# Patient Record
Sex: MALE | Race: WHITE | URBAN - METROPOLITAN AREA
[De-identification: names, ages, dates, MRNs, and addresses within clinical notes are randomized per-mention and may not be internally consistent; named-entity substitution may affect disease eponyms.]

---

## 2017-03-18 ENCOUNTER — OFFICE VISIT (OUTPATIENT)
Dept: URGENT CARE | Facility: URGENT CARE | Age: 22
End: 2017-03-18
Payer: COMMERCIAL

## 2017-03-18 VITALS
DIASTOLIC BLOOD PRESSURE: 86 MMHG | SYSTOLIC BLOOD PRESSURE: 139 MMHG | HEART RATE: 80 BPM | OXYGEN SATURATION: 97 % | TEMPERATURE: 97.8 F

## 2017-03-18 DIAGNOSIS — R42 POSTURAL DIZZINESS WITH PRESYNCOPE: ICD-10-CM

## 2017-03-18 DIAGNOSIS — R23.2 HOT FLASHES: ICD-10-CM

## 2017-03-18 DIAGNOSIS — R55 POSTURAL DIZZINESS WITH PRESYNCOPE: ICD-10-CM

## 2017-03-18 DIAGNOSIS — L50.9 URTICARIA: Primary | ICD-10-CM

## 2017-03-18 LAB
BASOPHILS # BLD AUTO: 0 10E9/L (ref 0–0.2)
BASOPHILS NFR BLD AUTO: 0.3 %
DIFFERENTIAL METHOD BLD: NORMAL
EOSINOPHIL # BLD AUTO: 0.2 10E9/L (ref 0–0.7)
EOSINOPHIL NFR BLD AUTO: 2.5 %
ERYTHROCYTE [DISTWIDTH] IN BLOOD BY AUTOMATED COUNT: 12.3 % (ref 10–15)
ERYTHROCYTE [SEDIMENTATION RATE] IN BLOOD BY WESTERGREN METHOD: 3 MM/H (ref 0–15)
HCT VFR BLD AUTO: 50.2 % (ref 40–53)
HGB BLD-MCNC: 17 G/DL (ref 13.3–17.7)
LYMPHOCYTES # BLD AUTO: 2.2 10E9/L (ref 0.8–5.3)
LYMPHOCYTES NFR BLD AUTO: 33.3 %
MCH RBC QN AUTO: 30.7 PG (ref 26.5–33)
MCHC RBC AUTO-ENTMCNC: 33.9 G/DL (ref 31.5–36.5)
MCV RBC AUTO: 91 FL (ref 78–100)
MONOCYTES # BLD AUTO: 0.5 10E9/L (ref 0–1.3)
MONOCYTES NFR BLD AUTO: 7 %
NEUTROPHILS # BLD AUTO: 3.7 10E9/L (ref 1.6–8.3)
NEUTROPHILS NFR BLD AUTO: 56.9 %
PLATELET # BLD AUTO: 250 10E9/L (ref 150–450)
RBC # BLD AUTO: 5.53 10E12/L (ref 4.4–5.9)
WBC # BLD AUTO: 6.5 10E9/L (ref 4–11)

## 2017-03-18 PROCEDURE — 99202 OFFICE O/P NEW SF 15 MIN: CPT | Performed by: FAMILY MEDICINE

## 2017-03-18 PROCEDURE — 85652 RBC SED RATE AUTOMATED: CPT | Performed by: FAMILY MEDICINE

## 2017-03-18 PROCEDURE — 36415 COLL VENOUS BLD VENIPUNCTURE: CPT | Performed by: FAMILY MEDICINE

## 2017-03-18 PROCEDURE — 80050 GENERAL HEALTH PANEL: CPT | Performed by: FAMILY MEDICINE

## 2017-03-18 RX ORDER — CETIRIZINE HYDROCHLORIDE 10 MG/1
10-20 TABLET ORAL EVERY EVENING
Qty: 60 TABLET | Refills: 1 | Status: SHIPPED | OUTPATIENT
Start: 2017-03-18

## 2017-03-18 NOTE — NURSING NOTE
"Chief Complaint   Patient presents with     Urgent Care     Pt in clinic to have eval for hot flashes and feeling of pins and needles on head, shoulders, and arms.     Derm Problem       Initial /86 (BP Location: Left arm, Patient Position: Chair, Cuff Size: Adult Large)  Pulse 80  Temp 97.8  F (36.6  C) (Tympanic)  SpO2 97% Estimated body mass index is 19.8 kg/(m^2) as calculated from the following:    Height as of 8/8/12: 5' 11\" (1.803 m).    Weight as of 8/8/12: 142 lb (64.4 kg).  Medication Reconciliation: complete   Doreen Mcclellan/ MA    "

## 2017-03-18 NOTE — MR AVS SNAPSHOT
After Visit Summary   3/18/2017    Scott Saravia    MRN: [id number]           Patient Information     Date Of Birth          1995        Visit Information        Provider Department      3/18/2017 4:30 PM Ruthann Bragg MD [facility]        Today's Diagnoses     Urticaria    -  1    Hot flashes          Care Instructions      Understanding Urticaria (Hives)  Urticaria (hives) are red, itchy, and swollen areas on the skin. They are most often an allergic reaction from eating a food or taking a medicine. Sometimes the cause may be unknown. A single hive can vary in size from a half inch to several inches. Hives can appear all over the body. Or they may appear on only one part of the body.  Causes of Hives  Hives can be caused by food and beverages such as:    Nuts    Peanuts    Eggs    Shellfish    Milk  Hives can also be caused by medicines such as:    Antibiotics, especially penicillin and sulfa-based medicines     Anticonvulsant drugs or antiseizure medicines     Chemotherapy medicines   Other causes of hives include:    Dermatographism. These are hives caused by scratching or rubbing of the skin, or wearing tight-fitting clothes that rub the skin.    Cold-induced. These are hives caused by exposure to cold air or water.    Solar hives. These are hives caused by exposure to sunlight or light bulb light.    Exercise-induced urticaria. These are allergic symptoms brought on by physical activity.    Chronic urticaria. These are hives that occur again and again with no known cause.  If You Have Hives    Avoid the food, drink, medicine, or other factor that may be causing the hives.    Make a thick paste of baking soda and water. Apply the paste directly to your skin. This can help lessen itching.    Talk with your health care provider right away if you think a medication gave you hives.  Watch for Anaphalaxis  If you have hives, watch for symptoms of a severe  "reaction that affects your entire body, called anaphylaxis. Symptoms can include swollen areas of the body, wheezing, trouble breathing or swallowing, and a hoarse voice. This reaction may happen right away. Or it may happen in an hour or more. In extreme cases, the airways from mouth to lungs may swell and prevent breathing. This is a medical emergency. Use epinephrine medication if you have it, and call 911 or go to the emergency room.     When to Call the Health Care Provider  Call 911 right away if you have:    Swelling in the lips, tongue, or throat (angioedema)     Trouble breathing or swallowing        5953-4059 [employer]. [address] [place] [address]. All rights reserved. This information is not intended as a substitute for professional medical care. Always follow your healthcare professional's instructions.              Follow-ups after your visit        Who to contact     If you have questions or need follow up information about today's clinic visit or your schedule please contact [facility] directly at [phone].  Normal or non-critical lab and imaging results will be communicated to you by DFinehart, letter or phone within 4 business days after the clinic has received the results. If you do not hear from us within 7 days, please contact the clinic through AdYouNett or phone. If you have a critical or abnormal lab result, we will notify you by phone as soon as possible.  Submit refill requests through [employer] or call your pharmacy and they will forward the refill request to us. Please allow 3 business days for your refill to be completed.          Additional Information About Your Visit        [employer] Information     [employer] lets you send messages to your doctor, view your test results, renew your prescriptions, schedule appointments and more. To sign up, go to [email or web address] . Click on \"Log in\" on the left side of the screen, which will take " "you to the Welcome page. Then click on \"Sign up Now\" on the right side of the page.     You will be asked to enter the access code listed below, as well as some personal information. Please follow the directions to create your username and password.     Your access code is: [email or web address]  Expires: 2017  5:31 PM     Your access code will  in 90 days. If you need help or a new code, please call your [facility] clinic or [phone].        Care EveryWhere ID     This is your Care EveryWhere ID. This could be used by other organizations to access your [facility] medical records  [id number]        Your Vitals Were     Pulse Temperature Pulse Oximetry             80 97.8  F (36.6  C) (Tympanic) 97%          Blood Pressure from Last 3 Encounters:   17 139/86   12 121/68   03/23/10 116/73    Weight from Last 3 Encounters:   12 142 lb (64.4 kg) (73 %)*   09/13/10 105 lb (47.6 kg) (52 %)*   03/23/10 96 lb (43.5 kg) (45 %)*     * Growth percentiles are based on [facility] 2-20 Years data.              We Performed the Following     CBC with platelets and differential     Comprehensive metabolic panel (BMP + Alb, Alk Phos, ALT, AST, Total. Bili, TP)     ESR: Erythrocyte sedimentation rate     TSH with free T4 reflex          Today's Medication Changes          These changes are accurate as of: 3/18/17  5:31 PM.  If you have any questions, ask your nurse or doctor.               Start taking these medicines.        Dose/Directions    cetirizine 10 MG tablet   Commonly known as:  zyrTEC   Used for:  Hot flashes, Urticaria   Started by:  Ruthann Bragg MD        Dose:  10-20 mg   Take 1-2 tablets (10-20 mg) by mouth every evening   Quantity:  60 tablet   Refills:  1            Where to get your medicines      These medications were sent to [facility] - [place], [place] -  [address] AT [facility] [address]   [address] [place] [address]     Phone:  [phone]     " cetirizine 10 MG tablet                Primary Care Provider Office Phone # Fax #    Dg Cristhian Gibbs PA-C [phone]       [facility] [address] [facility] [address]        Thank you!     Thank you for choosing [facility]  for your care. Our goal is always to provide you with excellent care. Hearing back from our patients is one way we can continue to improve our services. Please take a few minutes to complete the written survey that you may receive in the mail after your visit with us. Thank you!             Your Updated Medication List - Protect others around you: Learn how to safely use, store and throw away your medicines at www.disposemymeds.org.          This list is accurate as of: 3/18/17  5:31 PM.  Always use your most recent med list.                   Brand Name Dispense Instructions for use    cetirizine 10 MG tablet    zyrTEC    60 tablet    Take 1-2 tablets (10-20 mg) by mouth every evening       NO ACTIVE MEDICATIONS      .       UNABLE TO FIND      MEDICATION NAME: Jose Roberto

## 2017-03-18 NOTE — LETTER
[facility]   [address], [place]  [address]  [phone]        March 26, 2017      Scott Saravia  [address]  [place] [address]          Dear Mr. Saravia,    The results of your recent lab tests were within normal limits. Enclosed is a copy of these results.  If you have any further questions or problems, please contact our office.    Sincerely,      [facility]    Resulted Orders   CBC with platelets and differential   Result Value Ref Range    WBC 6.5 4.0 - 11.0 10e9/L    RBC Count 5.53 4.4 - 5.9 10e12/L    Hemoglobin 17.0 13.3 - 17.7 g/dL    Hematocrit 50.2 40.0 - 53.0 %    MCV 91 78 - 100 fl    MCH 30.7 26.5 - 33.0 pg    MCHC 33.9 31.5 - 36.5 g/dL    RDW 12.3 10.0 - 15.0 %    Platelet Count 250 150 - 450 10e9/L    Diff Method Automated Method     % Neutrophils 56.9 %    % Lymphocytes 33.3 %    % Monocytes 7.0 %    % Eosinophils 2.5 %    % Basophils 0.3 %    Absolute Neutrophil 3.7 1.6 - 8.3 10e9/L    Absolute Lymphocytes 2.2 0.8 - 5.3 10e9/L    Absolute Monocytes 0.5 0.0 - 1.3 10e9/L    Absolute Eosinophils 0.2 0.0 - 0.7 10e9/L    Absolute Basophils 0.0 0.0 - 0.2 10e9/L   Comprehensive metabolic panel (BMP + Alb, Alk Phos, ALT, AST, Total. Bili, TP)   Result Value Ref Range    Sodium 141 133 - 144 mmol/L    Potassium 4.4 3.4 - 5.3 mmol/L    Chloride 103 98 - 110 mmol/L    Carbon Dioxide 31 20 - 32 mmol/L    Anion Gap 7 3 - 14 mmol/L    Glucose 86 70 - 99 mg/dL    Urea Nitrogen 16 7 - 30 mg/dL    Creatinine 0.92 0.50 - 1.00 mg/dL    GFR Estimate >90  Non  GFR Calc   >60 mL/min/1.7m2    GFR Estimate If Black >90   GFR Calc   >60 mL/min/1.7m2    Calcium 9.4 8.5 - 10.1 mg/dL    Bilirubin Total 1.1 0.2 - 1.3 mg/dL    Albumin 4.6 3.4 - 5.0 g/dL    Protein Total 8.1 6.8 - 8.8 g/dL    Alkaline Phosphatase 82 65 - 260 U/L    ALT 35 0 - 50 U/L    AST 16 0 - 35 U/L   ESR: Erythrocyte sedimentation rate   Result Value  Ref Range    Sed Rate 3 0 - 15 mm/h   TSH with free T4 reflex   Result Value Ref Range    TSH 3.66 0.40 - 4.00 mU/L

## 2017-03-18 NOTE — PATIENT INSTRUCTIONS
Understanding Urticaria (Hives)  Urticaria (hives) are red, itchy, and swollen areas on the skin. They are most often an allergic reaction from eating a food or taking a medicine. Sometimes the cause may be unknown. A single hive can vary in size from a half inch to several inches. Hives can appear all over the body. Or they may appear on only one part of the body.  Causes of Hives  Hives can be caused by food and beverages such as:    Nuts    Peanuts    Eggs    Shellfish    Milk  Hives can also be caused by medicines such as:    Antibiotics, especially penicillin and sulfa-based medicines     Anticonvulsant drugs or antiseizure medicines     Chemotherapy medicines   Other causes of hives include:    Dermatographism. These are hives caused by scratching or rubbing of the skin, or wearing tight-fitting clothes that rub the skin.    Cold-induced. These are hives caused by exposure to cold air or water.    Solar hives. These are hives caused by exposure to sunlight or light bulb light.    Exercise-induced urticaria. These are allergic symptoms brought on by physical activity.    Chronic urticaria. These are hives that occur again and again with no known cause.  If You Have Hives    Avoid the food, drink, medicine, or other factor that may be causing the hives.    Make a thick paste of baking soda and water. Apply the paste directly to your skin. This can help lessen itching.    Talk with your health care provider right away if you think a medication gave you hives.  Watch for Anaphalaxis  If you have hives, watch for symptoms of a severe reaction that affects your entire body, called anaphylaxis. Symptoms can include swollen areas of the body, wheezing, trouble breathing or swallowing, and a hoarse voice. This reaction may happen right away. Or it may happen in an hour or more. In extreme cases, the airways from mouth to lungs may swell and prevent breathing. This is a medical emergency. Use epinephrine medication if  you have it, and call 911 or go to the emergency room.     When to Call the Health Care Provider  Call 911 right away if you have:    Swelling in the lips, tongue, or throat (angioedema)     Trouble breathing or swallowing        9522-4453 [employer]. [address] [place] [address]. All rights reserved. This information is not intended as a substitute for professional medical care. Always follow your healthcare professional's instructions.

## 2017-03-19 LAB
ALBUMIN SERPL-MCNC: 4.6 G/DL (ref 3.4–5)
ALP SERPL-CCNC: 82 U/L (ref 65–260)
ALT SERPL W P-5'-P-CCNC: 35 U/L (ref 0–50)
ANION GAP SERPL CALCULATED.3IONS-SCNC: 7 MMOL/L (ref 3–14)
AST SERPL W P-5'-P-CCNC: 16 U/L (ref 0–35)
BILIRUB SERPL-MCNC: 1.1 MG/DL (ref 0.2–1.3)
BUN SERPL-MCNC: 16 MG/DL (ref 7–30)
CALCIUM SERPL-MCNC: 9.4 MG/DL (ref 8.5–10.1)
CHLORIDE SERPL-SCNC: 103 MMOL/L (ref 98–110)
CO2 SERPL-SCNC: 31 MMOL/L (ref 20–32)
CREAT SERPL-MCNC: 0.92 MG/DL (ref 0.5–1)
GFR SERPL CREATININE-BSD FRML MDRD: NORMAL ML/MIN/1.7M2
GLUCOSE SERPL-MCNC: 86 MG/DL (ref 70–99)
POTASSIUM SERPL-SCNC: 4.4 MMOL/L (ref 3.4–5.3)
PROT SERPL-MCNC: 8.1 G/DL (ref 6.8–8.8)
SODIUM SERPL-SCNC: 141 MMOL/L (ref 133–144)
TSH SERPL DL<=0.005 MIU/L-ACNC: 3.66 MU/L (ref 0.4–4)

## 2017-03-19 NOTE — PROGRESS NOTES
"SUBJECTIVE:  Scott Saravia is a 21 year old male who presents to the clinic today for \"painful pins and needles\" type sensation and rash on the chest and head area when exercises or gets hot.    Associated symptoms include: Feels flushed.  The episodes have been happening since 1 year ago but have become more often in the past week.  Previously, only had the symptoms after rode his bike and got overheated.  Once he took off his gear and felt cooler, the symptoms resolved.  This week, he gets the symptoms when moving about in a T-shirt or when taking a hot shower.  Yesterday, noticed some red blotches on the chest that lasted about 10 minutes and then went away.  Has h/o seasonal spring allergies.  Not taking any medications.  No dyspnea. No mouth swelling.  Not a smoker.  No drugs.  Eats healthy.      Past Medical History   Diagnosis Date     NO ACTIVE PROBLEMS      Current Outpatient Prescriptions   Medication Sig Dispense Refill     UNABLE TO FIND MEDICATION NAME: [place]       cetirizine (ZYRTEC) 10 MG tablet Take 1-2 tablets (10-20 mg) by mouth every evening 60 tablet 1     NO ACTIVE MEDICATIONS .       Social History   Substance Use Topics     Smoking status: Never Smoker     Smokeless tobacco: Never Used     Alcohol use No       EXAM:   /86 (BP Location: Left arm, Patient Position: Chair, Cuff Size: Adult Large)  Pulse 80  Temp 97.8  F (36.6  C) (Tympanic)  SpO2 97%  GENERAL: alert, no acute distress.  SKIN: Rash description:  Gone at first and then, when got nervous, the rash appeared  Distribution: localized  Location: mid chest    Color: red,  Lesion type: macular, blotchy, isolated with dermatographism  GENERAL APPEARANCE: healthy, alert and no distress  EYES: Eyes grossly normal to inspection  HENT: ear canals and TM's normal.  Nose and mouth without ulcers, erythema or lesions  NECK: supple, non-tender to palpation, no adenopathy noted  RESP: lungs clear to auscultation - no rales, rhonchi or " wheezes  CV: regular rates and rhythm, normal S1 S2, no murmur noted    Labs:   Results for orders placed or performed in visit on 03/18/17   CBC with platelets and differential   Result Value Ref Range    WBC 6.5 4.0 - 11.0 10e9/L    RBC Count 5.53 4.4 - 5.9 10e12/L    Hemoglobin 17.0 13.3 - 17.7 g/dL    Hematocrit 50.2 40.0 - 53.0 %    MCV 91 78 - 100 fl    MCH 30.7 26.5 - 33.0 pg    MCHC 33.9 31.5 - 36.5 g/dL    RDW 12.3 10.0 - 15.0 %    Platelet Count 250 150 - 450 10e9/L    Diff Method Automated Method     % Neutrophils 56.9 %    % Lymphocytes 33.3 %    % Monocytes 7.0 %    % Eosinophils 2.5 %    % Basophils 0.3 %    Absolute Neutrophil 3.7 1.6 - 8.3 10e9/L    Absolute Lymphocytes 2.2 0.8 - 5.3 10e9/L    Absolute Monocytes 0.5 0.0 - 1.3 10e9/L    Absolute Eosinophils 0.2 0.0 - 0.7 10e9/L    Absolute Basophils 0.0 0.0 - 0.2 10e9/L   ESR: Erythrocyte sedimentation rate   Result Value Ref Range    Sed Rate 3 0 - 15 mm/h      ASSESSMENT:  Urticaria-likely cholinergic induced  Hot Flashes  Postural dizziness with presyncope (during the blood draw)    PLAN:  1)  Patient felt faint after the blood was drawn.  His blood pressure was 90's/60's at first and then 110's/70's.  He was laid on the ground and cold packs placed to posterior neck area.  He ate some cookies and had some OJ and felt back to normal.  See today's orders.   Await pending labs.    2)  Follow-up with Derm if not improving.  Ruthann Carmona MD

## 2017-03-27 ENCOUNTER — OFFICE VISIT (OUTPATIENT)
Dept: ALLERGY | Facility: CLINIC | Age: 22
End: 2017-03-27
Payer: COMMERCIAL

## 2017-03-27 VITALS
HEIGHT: 73 IN | SYSTOLIC BLOOD PRESSURE: 138 MMHG | HEART RATE: 79 BPM | DIASTOLIC BLOOD PRESSURE: 73 MMHG | OXYGEN SATURATION: 97 % | WEIGHT: 165.2 LBS | BODY MASS INDEX: 21.89 KG/M2

## 2017-03-27 DIAGNOSIS — J30.1 SEASONAL ALLERGIC RHINITIS DUE TO POLLEN: ICD-10-CM

## 2017-03-27 DIAGNOSIS — L50.5 CHOLINERGIC URTICARIA: Primary | ICD-10-CM

## 2017-03-27 PROCEDURE — 99203 OFFICE O/P NEW LOW 30 MIN: CPT | Performed by: ALLERGY & IMMUNOLOGY

## 2017-03-27 RX ORDER — DIPHENHYDRAMINE HYDROCHLORIDE, ZINC ACETATE 2; .1 G/100G; G/100G
CREAM TOPICAL 2 TIMES DAILY PRN
COMMUNITY

## 2017-03-27 NOTE — PATIENT INSTRUCTIONS
Allergy Staff Appt Hours Shot Hours Locations    Physician     Seth Callejas DO       Support Staff     Farzaneh RUFF RN      Edith PETERSON MA  Monday:                      [place] 8-7     Tuesday:         [place] 8-5 Wednesday:        [place]: 7-5     Friday:        [place] 7-5   [place]        Monday: 9-6        Friday: 7-2     [place]        Tuesday: 7-12 Thursday: 1-6 [place] Tuesday: 1-6 Wednesday: 11-6 Thursday: 7-12 [facility]  [address] [place] [address]  Appt Line: [phone]  Allergy RN (Monday):  [phone]    [facility]  [address] [place] [address]  Appt Line: [phone]  Allergy RN (Tues & Wed):  [phone]    [facility]  [address] [place] [address]  Appt Line: [phone]  Allergy RN (Friday):  [phone]       Important Scheduling Information  Aspirin Desensitization: Appt will last 2 clinic days. Please call the Allergy RN line for your clinic to schedule. Discontinue antihistamines 7 days prior to the appointment.     Food Challenges: Appt will last 3-4 hours. Please call the Allergy RN line for your clinic to schedule. Discontinue antihistamines 7 days prior to the appointment.     Penicillin Testing: Appt will last 2-3 hours. Please call the Allergy RN line for your clinic to schedule. Discontinue antihistamines 7 days prior to the appointment.     Skin Testing: Appt will about 40 minutes. Call the appointment line for your clinic to schedule. Discontinue antihistamines 7 days prior to the appointment.     Venom Testing: Appt will last 2-3 hours. Please call the Allergy RN line for your clinic to schedule. Discontinue antihistamines 7 days prior to the appointment.     Thank you for trusting us with your Allergy, Asthma, and Immunology care. Please feel free to contact us with any questions or concerns you may have.      - Increase Zyrtec to 20mg by mouth twice daily.   - Stop Benadryl.   -  If you continue to have symptoms please contact our office.

## 2017-03-27 NOTE — MR AVS SNAPSHOT
After Visit Summary   3/27/2017    Scott Saravia    MRN: [id number]           Patient Information     Date Of Birth          1995        Visit Information        Provider Department      3/27/2017 4:20 PM Seth Callejas DO [facility] Instructions    Allergy Staff Appt Hours Shot Hours Locations    Physician     Seth Callejas DO       Support Staff     AKSHAT cMdonald MA  Monday:                      [place] 8-7     Tuesday:         [place] 8-5     Wednesday:        [place]: 7-5     Friday:        [place] 7-5   [place] Monday: 9-6 Friday: 7-2     [place]        Tuesday: 7-12 Thursday: 1-6 [place] Tuesday: 1-6 Wednesday: 11-6 Thursday: 7-12 [facility]  [address] [place] [address]  Appt Line: [phone]  Allergy RN (Monday):  [phone]    [facility]  [address] [place] [address]  Appt Line: [phone]  Allergy RN (Tues & Wed):  [phone]    [facility]  [address] [place] [address]  Appt Line: [phone]  Allergy RN (Friday):  [phone]       Important Scheduling Information  Aspirin Desensitization: Appt will last 2 clinic days. Please call the Allergy RN line for your clinic to schedule. Discontinue antihistamines 7 days prior to the appointment.     Food Challenges: Appt will last 3-4 hours. Please call the Allergy RN line for your clinic to schedule. Discontinue antihistamines 7 days prior to the appointment.     Penicillin Testing: Appt will last 2-3 hours. Please call the Allergy RN line for your clinic to schedule. Discontinue antihistamines 7 days prior to the appointment.     Skin Testing: Appt will about 40 minutes. Call the appointment line for your clinic to schedule. Discontinue antihistamines 7 days prior to the appointment.     Venom Testing: Appt will last 2-3 hours. Please call the Allergy RN line for your clinic  to schedule. Discontinue antihistamines 7 days prior to the appointment.     Thank you for trusting us with your Allergy, Asthma, and Immunology care. Please feel free to contact us with any questions or concerns you may have.      - Increase Zyrtec to 20mg by mouth twice daily.   - Stop Benadryl.   - If you continue to have symptoms please contact our office.         Follow-ups after your visit        Follow-up notes from your care team     Return in about 3 months (around 6/27/2017).      Your next 10 appointments already scheduled     May 22, 2017  1:55 PM CDT   (Arrive by 1:40 PM)   New Allergy with Cristhian Callaway MD   [facility] ([facility])    [address]  [address] [facility] [address] [phone]           Do not take anti-histamines or Zantac for seven day prior to your appointment.              Who to contact     If you have questions or need follow up information about today's clinic visit or your schedule please contact [facility] directly at [phone].  Normal or non-critical lab and imaging results will be communicated to you by stylefruitshart, letter or phone within 4 business days after the clinic has received the results. If you do not hear from us within 7 days, please contact the clinic through Pl[employer] or phone. If you have a critical or abnormal lab result, we will notify you by phone as soon as possible.  Submit refill requests through [employer] or call your pharmacy and they will forward the refill request to us. Please allow 3 business days for your refill to be completed.          Additional Information About Your Visit        [employer] Information     [employer] gives you secure access to your electronic health record. If you see a primary care provider, you can also send messages to your care team and make appointments. If you have questions, please call your primary care clinic.  If you do not have a  "primary care provider, please call [phone] and they will assist you.        Care EveryWhere ID     This is your Care EveryWhere ID. This could be used by other organizations to access your [facility] medical records  [id number]        Your Vitals Were     Pulse Height Pulse Oximetry BMI (Body Mass Index)          79 1.845 m (6' 0.64\") 97% 22.01 kg/m2         Blood Pressure from Last 3 Encounters:   03/27/17 138/73   03/18/17 139/86   08/08/12 121/68    Weight from Last 3 Encounters:   03/27/17 74.9 kg (165 lb 3.2 oz) (65 %)*   08/08/12 64.4 kg (142 lb) (73 %)*   09/13/10 47.6 kg (105 lb) (52 %)*     * Growth percentiles are based on [facility] 2-20 Years data.              Today, you had the following     No orders found for display       Primary Care Provider Office Phone # Fax #    Dg Gibbs PA-C [phone]       [facility] [address] [facility] [address]        Thank you!     Thank you for choosing [facility]  for your care. Our goal is always to provide you with excellent care. Hearing back from our patients is one way we can continue to improve our services. Please take a few minutes to complete the written survey that you may receive in the mail after your visit with us. Thank you!             Your Updated Medication List - Protect others around you: Learn how to safely use, store and throw away your medicines at www.disposemymeds.org.          This list is accurate as of: 3/27/17  4:54 PM.  Always use your most recent med list.                   Brand Name Dispense Instructions for use    cetirizine 10 MG tablet    zyrTEC    60 tablet    Take 1-2 tablets (10-20 mg) by mouth every evening       diphenhydrAMINE-zinc acetate cream    BENADRYL     Apply topically 2 times daily as needed for itching       MELATONIN PO      Take 3 mg by mouth At Bedtime       NO ACTIVE MEDICATIONS      .       UNABLE TO FIND      MEDICATION NAME: Jose Roberto         "

## 2017-03-27 NOTE — PROGRESS NOTES
Scott Saravia is a 21 year old White male with previous medical history significant for presumed spring seasonal allergic rhinitis. Scott Saravia is being seen today for evaluation of hives.     Patient reports that over the last few months when he has been exercising he has developed erythematous skin that is not raised that burns/stings. This can occur on his head or his torso. Symptoms will last a few minutes and then resolve on own. He first noticed when he was biking outside with extra gear in the cold weather. Subsequently, the patient has had episodes with warm showers, warm rooms and stress. He reports that thinking back he has rarely had this over the last few years. He was started on cetirizine 10 mg by mouth twice daily and diphenhydramine twice daily. This has been somewhat beneficial, but not entirely. He denies symptoms of angioedema, vomiting, diarrhea, shortness of breath, wheezing or tightness in his chest.    Patient has a history of mild congestion, ocular itching and ocular watering in the spring. No symptoms at any other time of the year. Symptoms are made worse with mowing grass. He typically does not medications for these symptoms. No history of allergy testing.    The patient has no history of asthma, eczema, food allergies, medications allergies.     ENVIRONMENTAL HISTORY: [place] The [place] is heated with a water. They does not have central air conditioning. The patient's bedroom is furnished with carpeting in bedroom.  Pets inside the house include 0 . There is not history of cockroach or mice infestation. There is/are 0 smokers in the house.  The house does not have a damp basement.     Past Medical History:   Diagnosis Date     NO ACTIVE PROBLEMS      History reviewed. No pertinent family history.  Past Surgical History:   Procedure Laterality Date     NO HISTORY OF SURGERY         REVIEW OF SYSTEMS:  General: negative for weight gain.  negative for weight loss. negative for changes in sleep.   Ears: negative for fullness. negative for hearing loss. negative for dizziness.   Nose: negative for snoring.negative for changes in smell. negative for drainage.   Eyes: negative for eye watering. negative for eye itching. negative for vision changes. negative for eye redness.  Throat: negative for hoarseness. negative for sore throat. negative for trouble swallowing.   Lungs: negative for shortness of breath.negative for wheezing. negative for sputum production.   Cardiovascular: negative for chest pain. negative for swelling of ankles. negative for fast or irregular heartbeat.   Gastrointestinal: negative for nausea. negative for heartburn. negative for acid reflux.   Musculoskeletal: negative for joint pain. negative for joint stiffness. negative for joint swelling.   Neurologic: negative for seizures. negative for fainting. negative for weakness.   Psychiatric: negative for changes in mood. negative for anxiety.   Endocrine: negative for cold intolerance. positive  for heat intolerance. negative for tremors.   Lymphatic: negative for lower extremity swelling. negative for lymph node swelling.   Hematologic: negative for easy bruising. negative for easy bleeding.  Integumentary: positive  for rash. negative for scaling. negative for nail changes.       Current Outpatient Prescriptions:      diphenhydrAMINE-zinc acetate (BENADRYL) cream, Apply topically 2 times daily as needed for itching, Disp: , Rfl:      MELATONIN PO, Take 3 mg by mouth At Bedtime, Disp: , Rfl:      cetirizine (ZYRTEC) 10 MG tablet, Take 1-2 tablets (10-20 mg) by mouth every evening, Disp: 60 tablet, Rfl: 1     UNABLE TO FIND, MEDICATION NAME: Jose Roberto, Disp: , Rfl:      NO ACTIVE MEDICATIONS, ., Disp: , Rfl:   Immunization History   Administered Date(s) Administered     DTAP (<7y) 1997, 1997, 1997, 01/29/1999, 07/19/2002     HIB 1997, 1997, 1997,  01/29/1999     Hepatitis B 1997, 1997, 02/12/1998     IPV 07/19/2002     MMR 06/02/1998, 07/19/2002     OPV 1997, 1997, 02/12/1998, 09/19/2002     Tdap (Adacel,Boostrix) 03/31/2009     Varicella 06/02/1998, 03/31/2009     No Known Allergies      EXAM:   Constitutional:  Appears well-developed and well-nourished. No distress.   HEENT:   Head: Normocephalic.   Right Ear: External ear normal. TM normal  Left Ear: External ear normal. TM normal  Mouth/Throat: No oropharyngeal exudate present.   No cobblestoning of posterior oropharynx.   Nasal tissue pink and normal appearing.  No rhinorrhea noted.    Eyes: Conjunctivae are non-erythematous   No maxillary or frontal sinus tenderness to palpation.   Cardiovascular: Normal rate, regular rhythm and normal heart sounds. Exam reveals no gallop and no friction rub.   No murmur heard.  Respiratory: Effort normal and breath sounds normal. No respiratory distress. No wheezes. No rales.   Musculoskeletal: Normal range of motion.   Neuro: Oriented to person, place, and time.  Skin: Skin is warm and dry. No rash noted.   Psychiatric: Normal mood and flat affect.     Nursing note and vitals reviewed.    ASSESSMENT/PLAN:  Problem List Items Addressed This Visit        Respiratory    Seasonal allergic rhinitis due to pollen     Presumed spring allergic rhinoconjunctivitis. Typically no treatment. No allergy testing.    - Oral antihistamine as needed.            Musculoskeletal and Integumentary    Cholinergic urticaria - Primary     History of erythematous, burning/stinging skin after increased body temperature. Has occurred with exercise and with non-exercise when body temperature has been increased. Symptoms are typically transient and short-lived in nature and resolved on own. No angioedema. No other IgE mediated symptoms. Cetirizine and diphenhydramine have been somewhat beneficial, but not entirely.    - Discussed cholinergic urticaria with the patient.  -  Increase cetirizine to 20 mg by mouth twice daily.  - If he continues to have lesions despite cetirizine he was instructed to call our clinic.  - Return to clinic in 3 months.          Relevant Medications    diphenhydrAMINE-zinc acetate (BENADRYL) cream          Chart documentation with Dragon Voice recognition Software. Although reviewed after completion, some words and grammatical errors may remain.    Seth Callejas DO   Allergy/Immunology  [facility], [place] and [place], [place]

## 2017-03-27 NOTE — ASSESSMENT & PLAN NOTE
History of erythematous, burning/stinging skin after increased body temperature. Has occurred with exercise and with non-exercise when body temperature has been increased. Symptoms are typically transient and short-lived in nature and resolved on own. No angioedema. No other IgE mediated symptoms. Cetirizine and diphenhydramine have been somewhat beneficial, but not entirely.    - Discussed cholinergic urticaria with the patient.  - Increase cetirizine to 20 mg by mouth twice daily.  - If he continues to have lesions despite cetirizine he was instructed to call our clinic.  - Return to clinic in 3 months.

## 2017-03-27 NOTE — NURSING NOTE
"Chief Complaint   Patient presents with     Consult     hives while sweating       Initial /73 (BP Location: Right arm, Patient Position: Chair, Cuff Size: Adult Regular)  Pulse 79  Ht 6' 0.64\" (1.845 m)  Wt 165 lb 3.2 oz (74.9 kg)  SpO2 97%  BMI 22.01 kg/m2 Estimated body mass index is 22.01 kg/(m^2) as calculated from the following:    Height as of this encounter: 6' 0.64\" (1.845 m).    Weight as of this encounter: 165 lb 3.2 oz (74.9 kg).  Medication Reconciliation: complete   Edith Malik MA      "

## 2017-03-27 NOTE — ASSESSMENT & PLAN NOTE
Presumed spring allergic rhinoconjunctivitis. Typically no treatment. No allergy testing.    - Oral antihistamine as needed.

## 2018-06-02 ENCOUNTER — OFFICE VISIT (OUTPATIENT)
Dept: URGENT CARE | Facility: URGENT CARE | Age: 23
End: 2018-06-02
Payer: COMMERCIAL

## 2018-06-02 VITALS
WEIGHT: 165.13 LBS | TEMPERATURE: 98.6 F | OXYGEN SATURATION: 97 % | DIASTOLIC BLOOD PRESSURE: 77 MMHG | SYSTOLIC BLOOD PRESSURE: 134 MMHG | BODY MASS INDEX: 22 KG/M2 | HEART RATE: 78 BPM

## 2018-06-02 DIAGNOSIS — R07.0 THROAT PAIN: Primary | ICD-10-CM

## 2018-06-02 LAB
DEPRECATED S PYO AG THROAT QL EIA: NORMAL
SPECIMEN SOURCE: NORMAL

## 2018-06-02 PROCEDURE — 87081 CULTURE SCREEN ONLY: CPT | Performed by: PHYSICIAN ASSISTANT

## 2018-06-02 PROCEDURE — 99213 OFFICE O/P EST LOW 20 MIN: CPT | Performed by: PHYSICIAN ASSISTANT

## 2018-06-02 PROCEDURE — 87880 STREP A ASSAY W/OPTIC: CPT | Performed by: PHYSICIAN ASSISTANT

## 2018-06-02 NOTE — PROGRESS NOTES
S: 24 yo male is here with his mom for evaluation of sore throat that started yesterday.  He denies any cough.  No ear pain.  No nausea or vomiting.  No constipation or diarrhea.  He had some abdominal pain this week that seemed to start after he ate 5 fiber protein bars in 2 days but today the pain has subsided.        No Known Allergies    Past Medical History:   Diagnosis Date     NO ACTIVE PROBLEMS          Current Outpatient Prescriptions on File Prior to Visit:  MELATONIN PO Take 3 mg by mouth At Bedtime   cetirizine (ZYRTEC) 10 MG tablet Take 1-2 tablets (10-20 mg) by mouth every evening (Patient not taking: Reported on 6/2/2018)   diphenhydrAMINE-zinc acetate (BENADRYL) cream Apply topically 2 times daily as needed for itching   UNABLE TO FIND MEDICATION NAME: Jose Roberto     No current facility-administered medications on file prior to visit.     Social History   Substance Use Topics     Smoking status: Never Smoker     Smokeless tobacco: Never Used     Alcohol use No       ROS:  CONSTITUTIONAL: Negative for fatigue or fever.  EYES: Negative for eye problems.  ENT: As above.  RESP: As above.  CV: Negative for chest pains.  GI: Negative for vomiting.  MUSCULOSKELETAL:  Negative for significant muscle or joint pains.  NEUROLOGIC: Negative for headaches.  SKIN: Negative for rash.    OBJECTIVE:  /77 (BP Location: Right arm, Patient Position: Sitting, Cuff Size: Adult Regular)  Pulse 78  Temp 98.6  F (37  C) (Oral)  Wt 165 lb 2 oz (74.9 kg)  SpO2 97%  BMI 22 kg/m2  GENERAL APPEARANCE: Healthy, alert and no distress.  EYES:Conjunctiva/sclera clear.  EARS: No cerumen.   Ear canals w/o erythema.  TM's intact w/o erythema.    NOSE/MOUTH: Nose without ulcers, erythema or lesions.  SINUSES: No maxillary sinus tenderness.  THROAT: No erythema w/o tonsillar enlargement . No exudates.  NECK: Supple, nontender, no lymphadenopathy.  RESP: Lungs clear to auscultation - no rales, rhonchi or wheezes  CV: Regular rate  and rhythm, normal S1 S2, no murmur noted.  NEURO: Awake, alert    SKIN: No rashes  Abd- soft, NT, NABS  Results for orders placed or performed in visit on 06/02/18   Rapid strep screen   Result Value Ref Range    Specimen Description Throat     Rapid Strep A Screen       NEGATIVE: No Group A streptococcal antigen detected by immunoassay, await culture report.         ASSESSMENT:     ICD-10-CM    1. Throat pain R07.0 Rapid strep screen     Beta strep group A culture         PLAN: Likely viral.  Salt water gargles.  Mom asks about workup to check for food allergies or lactose intolerance.  Told her to obtain a primary care provider and have him be seen for evaluation.  She states that he has had intermittent abdominal  pain in the past.  Lots of rest and fluids.  RTC if any worsening symptoms or if not improving.    Liliya Patel PA-C

## 2018-06-02 NOTE — MR AVS SNAPSHOT
After Visit Summary   6/2/2018    Scott Saravia    MRN: [id number]           Patient Information     Date Of Birth          1995        Visit Information        Provider Department      6/2/2018 12:40 PM Liliya Patel PA-C [facility]        Today's Diagnoses     Throat pain    -  1       Follow-ups after your visit        Who to contact     If you have questions or need follow up information about today's clinic visit or your schedule please contact [facility] directly at [phone].  Normal or non-critical lab and imaging results will be communicated to you by MyChart, letter or phone within 4 business days after the clinic has received the results. If you do not hear from us within 7 days, please contact the clinic through S[employer]St[employer] or phone. If you have a critical or abnormal lab result, we will notify you by phone as soon as possible.  Submit refill requests through Shoutlet or call your pharmacy and they will forward the refill request to us. Please allow 3 business days for your refill to be completed.          Additional Information About Your Visit        MyChart Information     Shoutlet gives you secure access to your electronic health record. If you see a primary care provider, you can also send messages to your care team and make appointments. If you have questions, please call your primary care clinic.  If you do not have a primary care provider, please call [phone] and they will assist you.        Care EveryWhere ID     This is your Care EveryWhere ID. This could be used by other organizations to access your [facility] medical records  [id number]        Your Vitals Were     Pulse Temperature Pulse Oximetry BMI (Body Mass Index)          78 98.6  F (37  C) (Oral) 97% 22 kg/m2         Blood Pressure from Last 3 Encounters:   06/02/18 134/77   03/27/17 138/73   03/18/17 139/86    Weight from Last 3 Encounters:   06/02/18 165 lb 2 oz (74.9 kg)    03/27/17 165 lb 3.2 oz (74.9 kg) (65 %)*   08/08/12 142 lb (64.4 kg) (73 %)*     * Growth percentiles are based on [facility] 2-20 Years data.              We Performed the Following     Beta strep group A culture     Rapid strep screen          Today's Medication Changes          These changes are accurate as of 6/2/18  1:32 PM.  If you have any questions, ask your nurse or doctor.               Stop taking these medicines if you haven't already. Please contact your care team if you have questions.     NO ACTIVE MEDICATIONS   Stopped by:  Liliya Patel PA-C                    Primary Care Provider Office Phone # Fax #    Dg Cristhian Gibbs PA-C [phone] [address] [facility] [address]        Equal Access to Services     [facility] ABHILASH : Lisa bustamante Soliss, waaxda luqadaha, qaybta kaalmada adeegyatanika, sky cox . So [facility] [phone].    ATENCIÓN: Si habla español, tiene a nguyen disposición servicios gratuitos de asistencia lingüística. Llame al [phone].    We comply with applicable federal civil rights laws and [place] laws. We do not discriminate on the basis of race, color, national origin, age, disability, sex, sexual orientation, or gender identity.            Thank you!     Thank you for choosing [facility]  for your care. Our goal is always to provide you with excellent care. Hearing back from our patients is one way we can continue to improve our services. Please take a few minutes to complete the written survey that you may receive in the mail after your visit with us. Thank you!             Your Updated Medication List - Protect others around you: Learn how to safely use, store and throw away your medicines at www.disposemymeds.org.          This list is accurate as of 6/2/18  1:32 PM.  Always use your most recent med list.                   Brand Name Dispense Instructions for use Diagnosis    cetirizine 10 MG tablet     zyrTEC    60 tablet    Take 1-2 tablets (10-20 mg) by mouth every evening    Hot flashes, Urticaria       diphenhydrAMINE-zinc acetate cream    BENADRYL     Apply topically 2 times daily as needed for itching        MELATONIN PO      Take 3 mg by mouth At Bedtime        UNABLE TO FIND      MEDICATION NAME: Jose Roberto

## 2018-06-03 LAB
BACTERIA SPEC CULT: NORMAL
SPECIMEN SOURCE: NORMAL

## 2020-02-24 ENCOUNTER — HEALTH MAINTENANCE LETTER (OUTPATIENT)
Age: 25
End: 2020-02-24

## 2020-12-13 ENCOUNTER — HEALTH MAINTENANCE LETTER (OUTPATIENT)
Age: 25
End: 2020-12-13

## 2021-04-17 ENCOUNTER — HEALTH MAINTENANCE LETTER (OUTPATIENT)
Age: 26
End: 2021-04-17

## 2021-09-26 ENCOUNTER — HEALTH MAINTENANCE LETTER (OUTPATIENT)
Age: 26
End: 2021-09-26

## 2022-05-08 ENCOUNTER — HEALTH MAINTENANCE LETTER (OUTPATIENT)
Age: 27
End: 2022-05-08

## 2023-04-23 ENCOUNTER — HEALTH MAINTENANCE LETTER (OUTPATIENT)
Age: 28
End: 2023-04-23

## 2023-06-02 ENCOUNTER — HEALTH MAINTENANCE LETTER (OUTPATIENT)
Age: 28
End: 2023-06-02

## 2025-06-19 ENCOUNTER — OFFICE VISIT (OUTPATIENT)
Dept: URGENT CARE | Facility: URGENT CARE | Age: 30
End: 2025-06-19
Payer: COMMERCIAL

## 2025-06-19 VITALS
SYSTOLIC BLOOD PRESSURE: 131 MMHG | RESPIRATION RATE: 16 BRPM | WEIGHT: 169 LBS | HEART RATE: 66 BPM | HEIGHT: 74 IN | DIASTOLIC BLOOD PRESSURE: 89 MMHG | TEMPERATURE: 98.1 F | OXYGEN SATURATION: 99 % | BODY MASS INDEX: 21.69 KG/M2

## 2025-06-19 DIAGNOSIS — R52 BODY ACHES: Primary | ICD-10-CM

## 2025-06-19 DIAGNOSIS — J02.9 SORE THROAT: ICD-10-CM

## 2025-06-19 NOTE — PATIENT INSTRUCTIONS
Lab tests are pending.  We will contact you in 1-2 days if any are positive.  Get plenty of rest and drink fluids.  Can use Tylenol and/or ibuprofen as needed for pain.  Maximum dose of Tylenol is 4000mg in a 24 hour period of time.  Take ibuprofen with food to avoid stomach upset.  You can also try warm salt water gargles, hot/warm water or tea with honey and/or lemon and/or Cepacol lozenges or spray for your sore throat.     [Negative] : Heme/Lymph

## 2025-06-19 NOTE — PROGRESS NOTES
ASSESSMENT:  (R52) Body aches  (primary encounter diagnosis)  Plan: LYME DISEASE TOTAL ANTIBODIES WITH REFLEX TO         CONFIRMATION, Tick-Borne Disease Panel         (Non-Lyme) by PCR    (J02.9) Sore throat    PLAN:  Offered the patient strep testing given his sore throat and oropharyngeal erythema upon clinical exam; patient declined and was more concerned about tickborne illness given his reported being bit by a tick 2 weeks ago.  We discussed that his lab tests are pending and we will contact him within 1 to 2 days if any are positive.  We also discussed the need to get plenty rest, drink fluids and use Tylenol and/or ibuprofen as needed for pain with the maximum dose of Tylenol being 4000 mg in a 24-hour period of time and to take ibuprofen with food to avoid upset stomach.  Informed the patient to try warm salt water gargles, hot/warm water or tea with honey and/or lemon and or Cepacol lozenges or spray for the sore throat.  Discussed the need to return to clinic with any new or worsening symptoms.  Patient acknowledged his understanding of the above plan.    The use of Dragon/VenueSpot dictation services may have been used to construct the content in this note; any grammatical or spelling errors are non-intentional. Please contact the author of this note directly if you are in need of any clarification.      VANDA Levin CNP      SUBJECTIVE:   Scott Saravia is a 30 year old male presenting with a chief complaint of sore throat, body aches, and skin sensitivities.  Onset of symptoms was 1 day ago.  Patient is also concerned about Lyme's disease as he reports being bit by a tick 2 weeks ago.   Patient denies: runny nose, stuffy nose, cough - non-productive, ear pain, vomiting, and diarrhea  Treatment measures tried include OTC Cough med.  Predisposing factors include ill contact: Family member.    ROS:  Negative except noted above.    OBJECTIVE:  /89   Pulse 66   Temp 98.1  F  "(36.7  C) (Tympanic)   Resp 16   Ht 1.867 m (6' 1.5\")   Wt 76.7 kg (169 lb)   SpO2 99%   BMI 21.99 kg/m    GENERAL APPEARANCE: healthy, alert and no distress  EYES: EOMI,  PERRL, conjunctiva clear  HENT: TM's normal bilaterally and oropharyngeal erythema  NECK: supple, nontender, no lymphadenopathy  RESP: lungs clear to auscultation - no rales, rhonchi or wheezes  CV: regular rates and rhythm, normal S1 S2, no murmur noted  MS: no gross musculoskeletal defects noted, no edema  NEURO: Normal strength and tone, mentation intact and speech normal  SKIN: no suspicious lesions or rashes  "

## 2025-06-22 ENCOUNTER — HEALTH MAINTENANCE LETTER (OUTPATIENT)
Age: 30
End: 2025-06-22